# Patient Record
Sex: FEMALE
[De-identification: names, ages, dates, MRNs, and addresses within clinical notes are randomized per-mention and may not be internally consistent; named-entity substitution may affect disease eponyms.]

---

## 2022-02-05 ENCOUNTER — TRANSCRIPTION ENCOUNTER (OUTPATIENT)
Age: 67
End: 2022-02-05

## 2022-02-05 ENCOUNTER — EMERGENCY (EMERGENCY)
Facility: HOSPITAL | Age: 67
LOS: 1 days | Discharge: HOME | End: 2022-02-05
Attending: STUDENT IN AN ORGANIZED HEALTH CARE EDUCATION/TRAINING PROGRAM | Admitting: STUDENT IN AN ORGANIZED HEALTH CARE EDUCATION/TRAINING PROGRAM
Payer: MEDICARE

## 2022-02-05 VITALS
DIASTOLIC BLOOD PRESSURE: 59 MMHG | OXYGEN SATURATION: 98 % | HEART RATE: 60 BPM | RESPIRATION RATE: 18 BRPM | SYSTOLIC BLOOD PRESSURE: 136 MMHG

## 2022-02-05 VITALS
OXYGEN SATURATION: 100 % | DIASTOLIC BLOOD PRESSURE: 87 MMHG | HEART RATE: 58 BPM | TEMPERATURE: 98 F | SYSTOLIC BLOOD PRESSURE: 121 MMHG | RESPIRATION RATE: 14 BRPM

## 2022-02-05 LAB
ALBUMIN SERPL ELPH-MCNC: 4.1 G/DL — SIGNIFICANT CHANGE UP (ref 3.3–5)
ALP SERPL-CCNC: 69 U/L — SIGNIFICANT CHANGE UP (ref 40–120)
ALT FLD-CCNC: 22 U/L — SIGNIFICANT CHANGE UP (ref 4–33)
ANION GAP SERPL CALC-SCNC: 11 MMOL/L — SIGNIFICANT CHANGE UP (ref 7–14)
AST SERPL-CCNC: 26 U/L — SIGNIFICANT CHANGE UP (ref 4–32)
BASOPHILS # BLD AUTO: 0.03 K/UL — SIGNIFICANT CHANGE UP (ref 0–0.2)
BASOPHILS NFR BLD AUTO: 0.3 % — SIGNIFICANT CHANGE UP (ref 0–2)
BILIRUB SERPL-MCNC: 0.4 MG/DL — SIGNIFICANT CHANGE UP (ref 0.2–1.2)
BUN SERPL-MCNC: 11 MG/DL — SIGNIFICANT CHANGE UP (ref 7–23)
CALCIUM SERPL-MCNC: 9 MG/DL — SIGNIFICANT CHANGE UP (ref 8.4–10.5)
CHLORIDE SERPL-SCNC: 108 MMOL/L — HIGH (ref 98–107)
CO2 SERPL-SCNC: 24 MMOL/L — SIGNIFICANT CHANGE UP (ref 22–31)
CREAT SERPL-MCNC: 1.12 MG/DL — SIGNIFICANT CHANGE UP (ref 0.5–1.3)
EOSINOPHIL # BLD AUTO: 0.32 K/UL — SIGNIFICANT CHANGE UP (ref 0–0.5)
EOSINOPHIL NFR BLD AUTO: 3.5 % — SIGNIFICANT CHANGE UP (ref 0–6)
GLUCOSE SERPL-MCNC: 101 MG/DL — HIGH (ref 70–99)
HCT VFR BLD CALC: 35.7 % — SIGNIFICANT CHANGE UP (ref 34.5–45)
HGB BLD-MCNC: 11.6 G/DL — SIGNIFICANT CHANGE UP (ref 11.5–15.5)
IANC: 4.78 K/UL — SIGNIFICANT CHANGE UP (ref 1.5–8.5)
IMM GRANULOCYTES NFR BLD AUTO: 0.3 % — SIGNIFICANT CHANGE UP (ref 0–1.5)
LYMPHOCYTES # BLD AUTO: 3.25 K/UL — SIGNIFICANT CHANGE UP (ref 1–3.3)
LYMPHOCYTES # BLD AUTO: 36 % — SIGNIFICANT CHANGE UP (ref 13–44)
MCHC RBC-ENTMCNC: 26.1 PG — LOW (ref 27–34)
MCHC RBC-ENTMCNC: 32.5 GM/DL — SIGNIFICANT CHANGE UP (ref 32–36)
MCV RBC AUTO: 80.4 FL — SIGNIFICANT CHANGE UP (ref 80–100)
MONOCYTES # BLD AUTO: 0.62 K/UL — SIGNIFICANT CHANGE UP (ref 0–0.9)
MONOCYTES NFR BLD AUTO: 6.9 % — SIGNIFICANT CHANGE UP (ref 2–14)
NEUTROPHILS # BLD AUTO: 4.78 K/UL — SIGNIFICANT CHANGE UP (ref 1.8–7.4)
NEUTROPHILS NFR BLD AUTO: 53 % — SIGNIFICANT CHANGE UP (ref 43–77)
NRBC # BLD: 0 /100 WBCS — SIGNIFICANT CHANGE UP
NRBC # FLD: 0 K/UL — SIGNIFICANT CHANGE UP
PLATELET # BLD AUTO: 266 K/UL — SIGNIFICANT CHANGE UP (ref 150–400)
POTASSIUM SERPL-MCNC: 3.9 MMOL/L — SIGNIFICANT CHANGE UP (ref 3.5–5.3)
POTASSIUM SERPL-SCNC: 3.9 MMOL/L — SIGNIFICANT CHANGE UP (ref 3.5–5.3)
PROT SERPL-MCNC: 6.6 G/DL — SIGNIFICANT CHANGE UP (ref 6–8.3)
RBC # BLD: 4.44 M/UL — SIGNIFICANT CHANGE UP (ref 3.8–5.2)
RBC # FLD: 15.7 % — HIGH (ref 10.3–14.5)
SODIUM SERPL-SCNC: 143 MMOL/L — SIGNIFICANT CHANGE UP (ref 135–145)
TROPONIN T, HIGH SENSITIVITY RESULT: <6 NG/L — SIGNIFICANT CHANGE UP
WBC # BLD: 9.03 K/UL — SIGNIFICANT CHANGE UP (ref 3.8–10.5)
WBC # FLD AUTO: 9.03 K/UL — SIGNIFICANT CHANGE UP (ref 3.8–10.5)

## 2022-02-05 PROCEDURE — 93010 ELECTROCARDIOGRAM REPORT: CPT

## 2022-02-05 PROCEDURE — 71046 X-RAY EXAM CHEST 2 VIEWS: CPT | Mod: 26

## 2022-02-05 PROCEDURE — 99285 EMERGENCY DEPT VISIT HI MDM: CPT | Mod: 25

## 2022-02-05 RX ORDER — LIDOCAINE 4 G/100G
1 CREAM TOPICAL ONCE
Refills: 0 | Status: COMPLETED | OUTPATIENT
Start: 2022-02-05 | End: 2022-02-05

## 2022-02-05 RX ORDER — ACETAMINOPHEN 500 MG
650 TABLET ORAL ONCE
Refills: 0 | Status: COMPLETED | OUTPATIENT
Start: 2022-02-05 | End: 2022-02-05

## 2022-02-05 RX ORDER — IBUPROFEN 200 MG
600 TABLET ORAL ONCE
Refills: 0 | Status: COMPLETED | OUTPATIENT
Start: 2022-02-05 | End: 2022-02-05

## 2022-02-05 RX ADMIN — Medication 650 MILLIGRAM(S): at 16:00

## 2022-02-05 RX ADMIN — Medication 600 MILLIGRAM(S): at 16:05

## 2022-02-05 RX ADMIN — LIDOCAINE 1 PATCH: 4 CREAM TOPICAL at 16:00

## 2022-02-05 NOTE — ED PROVIDER NOTE - CLINICAL SUMMARY MEDICAL DECISION MAKING FREE TEXT BOX
Aureliano Diaz MD. pt with no significant pmhx presents for midsternal CP today. pt also having 3 days of L scapula pain in setting that pt did shovel snow 1 week ago. the pain is worse w/ movement. it is not pleuritic in nature. denies other associated sx. pt has reproducible chest wall tenderness in the midsternum where she is having pain as well as tender to the trapezius and left scapula. pt otherwise well appaering. lungs CTA. pt likely w/ MSK pain however, will eval for ACS. EKG non ischemic. will give analgesia, labs, cxr, reassess

## 2022-02-05 NOTE — ED PROVIDER NOTE - PROGRESS NOTE DETAILS
Booker SABILLON: Received sign out from my colleague Dr. Hartmann pt presents w chest discomfort likely MSK also a/w trapezius pain pending labs at time of sign out, trop neg, stable for dc w pmd/cards fu.

## 2022-02-05 NOTE — ED ADULT NURSE NOTE - OBJECTIVE STATEMENT
Patient is a 67 yo female, denies PMH, presenting with chest pain starting at 8AM. Patient stated she went to urgent care and they sent her to the ED for PVCs on EKG. Patient also reports she was shoveling snow on Saturday and started having back pain on Sunday. Patient AAOx4, endorsing SOB but denies dizziness, fever, nausea, vomiting, diarrhea. No signs of distress. Placed on cardiac monitor, VSS, NSR. 20G PIV placed to RAC, labs sent per orders. Patient educated on fall precautions.

## 2022-02-05 NOTE — ED PROVIDER NOTE - NSFOLLOWUPCLINICSTOKEN_GEN_ALL_ED_FT
[id number]: || ||00\01||False; [id number]: || ||00\01||False;[id number]: || ||00\01||False;[id number]: || ||00\01||False;[id number]: || ||00\01||False;[id number]: || ||00\01||False;

## 2022-02-05 NOTE — ED ADULT TRIAGE NOTE - CHIEF COMPLAINT QUOTE
Patient arrives with ems from home for c/o mid chest pain since Wednesday, getting worse today.  Patient states she was shoveling the snow on Saturday.
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
## 2022-02-05 NOTE — ED PROVIDER NOTE - NS ED ROS FT
Constitutional: no fevers; no chills  HEENT: no visual changes, no sore throat, no rhinorrhea  CV: cp; no palpitations  Resp: no sob; no cough  GI: no abd pain, no nausea, no vomiting, no diarrhea, no constipation  : no dysuria, no hematuria  MSK: L shoulder pain  skin: no rashes  neuro: no HA, no numbness; no weakness, no tingling  endo: no polyuria, no polydipsia

## 2022-02-05 NOTE — ED PROVIDER NOTE - NSFOLLOWUPINSTRUCTIONS_ED_ALL_ED_FT
You were seen today in the emergency room for chest pain. Although the testing done today indicates that your pain is not from an acute emergency, your pain could still represent a problem with your heart. You need to follow up with your doctor and/or a cardiologist in the next 48-72 hours. If you develop any new or worsening symptoms you need to return immediately to the emergency department. If you experience any of the following please come right back to the emergency room: chest pain that becomes much worse with walking up stairs or exercising, uncontrollable nausea and vomiting, severe chest pain that will not go away, passing out, new persistent numbness and/or weakness. If we discussed that this pain was likely due to a muscle strain or sprain you should take over the counter medications such as Tylenol. You should avoid taking medications such as ibuprofen, Motrin, Advil or other NSAIDs until you speak with your doctor about your pain.     You may take Tylenol 1000mg and ibuprofen 400mg every 6 hours as needed for pain as directed by your primary care physician.     You can also  a lidocaine patch at your local pharmacy.     A number for a cardiologist will be provided for you.

## 2022-02-05 NOTE — ED PROVIDER NOTE - OBJECTIVE STATEMENT
67 yo F with no significant PMHx presents to the ED c/o midsternal CP that occurred while walking her dog this morning. It resolved with rest. One week ago, pt shoveled snow and then was feeling fine up until 3 days ago, when she developed left scapular pain  that is worse w/ movement. She went to a walk in clinic, got an ECG done, and was told to go to the ED. She denies associated SOB, diaphoresis, n/v/d, cough, leg pain, leg swelling, dizziness, palpitations. She is fully vaccinated and boosted against COVID-19. Pt took Tylenol 500 mg at 8am this morning.

## 2022-02-05 NOTE — ED PROVIDER NOTE - PATIENT PORTAL LINK FT
You can access the FollowMyHealth Patient Portal offered by [facility] by registering at the following website: [email or web address]. By joining [facility]’s FollowMyHealth portal, you will also be able to view your health information using other applications (apps) compatible with our system.

## 2022-02-05 NOTE — ED PROVIDER NOTE - PHYSICAL EXAMINATION
PHYSICAL EXAM:  GENERAL: non-toxic appearing; in no respiratory distress  HEAD Atraumatic, Normocephalic  NECK: No JVD; trachea midline  EYES: PERRL, EOMs intact b/l w/out deficits; normal conjunctiva  CHEST/LUNG: CTAB no wheezes/rhonchi/rales  HEART: RRR no murmur/gallops/rubs  ABDOMEN: +BS, soft, NT, ND  EXTREMITIES: No LE edema, +2 radial pulses b/l, +2 DP/PT pulses b/l  MUSCULOSKELETAL: FROM of all 4 extremities; midsternal chest wall tenderness; tender to the left trapezius and scapular region;   NERVOUS SYSTEM:  A&Ox3, No motor deficits or sensory deficits; CNII-XII intact; Speech is fluent and appropriate  SKIN:  Warm and dry as visualized

## 2022-02-05 NOTE — ED PROVIDER NOTE - NSFOLLOWUPCLINICS_GEN_ALL_ED_FT
[facility]  Cardiology  [address] [place] [address], [address]  [place] [address]  Phone: [phone]  Fax:      [facility]  Internal Medicine  [address] [place] [address]  Phone: [phone]  Fax:     [facility]  Gastroenterology  [address] [place] [address]  Phone: [phone]  Fax:     [facility]  Cardiology  [address] [place] [address]  Phone: [phone]  Fax:     [facility]  General Internal Medicine  [address] [place] [address]  Phone: [phone]  Fax:     [facility]  Cardiology  [address] [facility], [address]  [place] [address]  Phone: [phone]  Fax:

## 2022-03-21 ENCOUNTER — APPOINTMENT (OUTPATIENT)
Dept: INTERNAL MEDICINE | Facility: CLINIC | Age: 67
End: 2022-03-21
Payer: MEDICARE

## 2022-03-21 VITALS — WEIGHT: 161 LBS | BODY MASS INDEX: 28.89 KG/M2 | HEIGHT: 62.5 IN

## 2022-03-21 VITALS
OXYGEN SATURATION: 98 % | HEART RATE: 80 BPM | RESPIRATION RATE: 15 BRPM | DIASTOLIC BLOOD PRESSURE: 80 MMHG | SYSTOLIC BLOOD PRESSURE: 130 MMHG

## 2022-03-21 DIAGNOSIS — E66.3 OVERWEIGHT: ICD-10-CM

## 2022-03-21 DIAGNOSIS — Z23 ENCOUNTER FOR IMMUNIZATION: ICD-10-CM

## 2022-03-21 DIAGNOSIS — Z00.00 ENCOUNTER FOR GENERAL ADULT MEDICAL EXAMINATION W/OUT ABNORMAL FINDINGS: ICD-10-CM

## 2022-03-21 DIAGNOSIS — Z83.3 FAMILY HISTORY OF DIABETES MELLITUS: ICD-10-CM

## 2022-03-21 DIAGNOSIS — Z60.2 PROBLEMS RELATED TO LIVING ALONE: ICD-10-CM

## 2022-03-21 DIAGNOSIS — Z80.1 FAMILY HISTORY OF MALIGNANT NEOPLASM OF TRACHEA, BRONCHUS AND LUNG: ICD-10-CM

## 2022-03-21 PROCEDURE — 90732 PPSV23 VACC 2 YRS+ SUBQ/IM: CPT

## 2022-03-21 PROCEDURE — G0438: CPT

## 2022-03-21 PROCEDURE — G0442 ANNUAL ALCOHOL SCREEN 15 MIN: CPT | Mod: 59

## 2022-03-21 PROCEDURE — 99406 BEHAV CHNG SMOKING 3-10 MIN: CPT

## 2022-03-21 PROCEDURE — G0009: CPT

## 2022-03-21 SDOH — SOCIAL STABILITY - SOCIAL INSECURITY: PROBLEMS RELATED TO LIVING ALONE: Z60.2

## 2022-03-21 NOTE — HEALTH RISK ASSESSMENT
[Current] : Current [10-14] : 10-14 [Never (0 pts)] : Never (0 points) [0] : 2) Feeling down, depressed, or hopeless: Not at all (0) [PHQ-2 Negative - No further assessment needed] : PHQ-2 Negative - No further assessment needed [Patient reported PAP Smear was normal] : Patient reported PAP Smear was normal [No] : In the past 12 months have you used drugs other than those required for medical reasons? No [PapSmearDate] : 01/2020 [ColonoscopyDate] : 01/2012 [HIVDate] : 2015 [HepatitisCDate] : 2015 [de-identified] : 5-8 cigarettes x 30 years [Audit-CScore] : 0 [de-identified] : see HPI [de-identified] : see HPI [[id number]] : 0

## 2022-03-21 NOTE — PHYSICAL EXAM
[Normal TMs] : both tympanic membranes were normal [No Edema] : there was no peripheral edema [Normal Supraclavicular Nodes] : no supraclavicular lymphadenopathy [Coordination Grossly Intact] : coordination grossly intact [Normal Gait] : normal gait [Normal] : affect was normal and insight and judgment were intact

## 2022-03-21 NOTE — HISTORY OF PRESENT ILLNESS
[FreeTextEntry8] : 66F with no PMH had visited urgent care in February for chest and upper back pain and was referred to [facility] ER 2/5/22 - she had midsternal CP that started when walking the dog. It resolved with rest. One week prior she developed left scapular pain after shoveling snow. She was discharged from the ER with negative troponin and dx of likely trapezius pain. She was instructed to f/u with PMD and cardiology. Trop was <6. CBC and CMP normal but GFR 56. Patient presents for f/u after ER visit and to establish care she was also told to follow up with a cardiologist. . \par \par PMH: none\par PSH: tonsillectomy age 22, carpal tunnel surgery right hand, neck surgery - metal plate in vertebrae. Had worked for phone company and developed neck problems. There is metal plate in neck. Left knee surgery - torn meniscus 2008. \par FH: mother and father passed away. mother had DM age 75. father had lung cancer -  exposure to asbestos. \par SH: current smoker. smokes 5-8 cigarettes/day. If pt is upset might smoke more. Smoked for 30 years. \par \par Quit date set by patient: 6/10/22\par \par Patient will start with 21/14/7mg patch daily for six weeks and will step down to lower dose patch as tolerated. They are also prescribed for nicotine gum which they will "chew and park" whenever they have a craving. Reviewed directions to chew gum until nicotine taste and "park" next to gums. Chew and replace as needed. They should f/u with me in the next couple weeks via telehealth visit to monitor progress on cigarette cessation. \par \par No alcohol use in 2 years. Stopped during pandemic in case she might have to drive somewhere. Otherwise might drink if she has company or a party. No drug use. \par Exercise - pt used to walk but afraid of getting COVID. \par Diet - home cooked foods. chicken, pork, lamb, turkey. vegetables. drinks water. no sugary drink. drinks coffee and puts sugar in that. \par Lives alone with dog, 31 year old son just moved out he lives in [place]. Has friends nearby. \par Retired, used to work for phone company. \par Does gardening. \par Not sexually active. \par HIV/HCV was negative - 2017\par \par \par \par Medications: none\par Allergies: adhesive on lidocaine patch, beef, thimerosal, soy, chocolate, seafood, preservatives. These cause stomach issues. Thimerosal causes eye swelling (in eye drops). Latex allergy. \par

## 2022-03-21 NOTE — ASSESSMENT
[FreeTextEntry1] : 66F with no PMH had visited urgent care in February for chest and upper back pain and was referred to [facility] ER 2/5/22 - she had midsternal CP that started when walking the dog. It resolved with rest. One week prior she developed left scapular pain after shoveling snow. She was discharged from the ER with negative troponin and dx of likely trapezius pain. She was instructed to f/u with PMD and cardiology. Trop was <6. CBC and CMP normal but GFR 56. Patient presents for f/u after ER visit and to establish care\par \par 1. Chest Pain\par -if she moves a certain way on the left side it might come back but for the most part it is resolved. EKG shows no PVC, it is normal (NSR)\par -d/w pt that we can do cardiology referral but she prefers not to go after we discussed trop and EKG results\par -discussed risk factors for cardiac disease - overweight BMI, cigarette smoking\par \par 2. Cigarette Smoking\par Quit date set by patient: 6/10/22\par \par Patient will start with 21/14/7mg patch daily for six weeks and will step down to lower dose patch as tolerated. They are also prescribed for nicotine gum which they will "chew and park" whenever they have a craving. Reviewed directions to chew gum until nicotine taste and "park" next to gums. Chew and replace as needed. They should f/u with me in the next couple weeks via telehealth or in person visit to monitor progress on cigarette cessation. \par \par 2. HCM\par -mammogram - giving referral\par -lung cancer screening - pack years less than 15\par -colon cancer screening: had colonoscopy in 2012\par -HIV/HCV screening - offered today\par -COVID vaccine - vaccinated and booster completed. \par -flu vaccine - declined\par -tdap - will get in pharmacy\par -shingrix - 4 years ago\par -prevnar 13 - 4 years ago. \par -pneumovax - giving today \par \par rtc 1 month for smoking cessation f/u

## 2022-03-21 NOTE — COUNSELING
[Cessation strategies including cessation program discussed] : Cessation strategies including cessation program discussed [Use of nicotine replacement therapies and other medications discussed] : Use of nicotine replacement therapies and other medications discussed [Encouraged to pick a quit date and identify support needed to quit] : Encouraged to pick a quit date and identify support needed to quit [Yes] : Willing to quit smoking [FreeTextEntry1] : 10

## 2022-03-22 LAB
CHOLEST SERPL-MCNC: 238 MG/DL
ESTIMATED AVERAGE GLUCOSE: 111 MG/DL
HBA1C MFR BLD HPLC: 5.5 %
HCV AB SER QL: NONREACTIVE
HCV S/CO RATIO: 0.24 S/CO
HDLC SERPL-MCNC: 68 MG/DL
HIV1+2 AB SPEC QL IA.RAPID: NONREACTIVE
LDLC SERPL CALC-MCNC: 150 MG/DL
NONHDLC SERPL-MCNC: 170 MG/DL
TRIGL SERPL-MCNC: 100 MG/DL

## 2022-03-31 LAB — HEMOCCULT STL QL IA: NEGATIVE

## 2022-05-02 ENCOUNTER — APPOINTMENT (OUTPATIENT)
Dept: INTERNAL MEDICINE | Facility: CLINIC | Age: 67
End: 2022-05-02
Payer: MEDICARE

## 2022-05-02 VITALS
DIASTOLIC BLOOD PRESSURE: 80 MMHG | RESPIRATION RATE: 15 BRPM | WEIGHT: 161 LBS | BODY MASS INDEX: 28.98 KG/M2 | SYSTOLIC BLOOD PRESSURE: 130 MMHG | HEART RATE: 71 BPM | OXYGEN SATURATION: 98 %

## 2022-05-02 PROCEDURE — 99214 OFFICE O/P EST MOD 30 MIN: CPT

## 2022-05-02 NOTE — COUNSELING
[Yes] : Risk of tobacco use and health benefits of smoking cessation discussed: Yes [Use of nicotine replacement therapies and other medications discussed] : Use of nicotine replacement therapies and other medications discussed [FreeTextEntry1] : 5

## 2022-05-02 NOTE — HISTORY OF PRESENT ILLNESS
[FreeTextEntry1] : smoking cessation f/u, statin counselling [de-identified] : 66F with no PMH had visited urgent care in February for chest and upper back pain and negative cardiac w/u who is a current smoker here for f/u of smoking cessation. \par \par Has not quit smoking yet as the month of April was very hard  on patient due to her taxes being lost and that her son moved out. Now that he moved out, she has settled into a comfortable and healthier routine with dietary decrease in saturated fat (her son likes to bake and eat seymour and cheese and enjoyed cooking).  She has a dog with her and is happy with her current living situation.  Last time we checked her lipid profile she had eaten breakfast and lunch before the labs are drawn.  Today she comes and is fasting and is looking forward to repeating the test.  We discussed that the ASCVD risk will probably still be elevated given her age and smoking history and how that impacts her risk for heart disease in the future.  She is going to work on quitting smoking.  Found that the prescription sent to the pharmacy were very expensive and is going to call the [facility] quits hotline (was written down for patient on printed lipid profile along with explanation for lipid profile elements).

## 2022-05-02 NOTE — ASSESSMENT
[FreeTextEntry1] : 66F with no PMH had visited urgent care in February for chest and upper back pain and negative cardiac w/u who is a current smoker here for f/u of smoking cessation. \par \par 1. Cigarette Smoking\par Quit date set by patient: 6/10/22 -reviewed below advice with patient.  Also provided with phone number for free looking cessation medications [phone]. \par \par Patient will start with 21/14/7mg patch daily for six weeks and will step down to lower dose patch as tolerated. They are also prescribed for nicotine gum which they will "chew and park" whenever they have a craving. Reviewed directions to chew gum until nicotine taste and "park" next to gums. Chew and replace as needed. They should f/u with me in the next couple weeks via telehealth or in person visit to monitor progress on cigarette cessation. \par \par 2. HCM\par -mammogram - referral given, not scheduled\par -lung cancer screening - pack years less than 15\par -colon cancer screening: had colonoscopy in 2012; repeat this year, referral given last visit. \par -HIV/HCV screening - neg 03/2022\par -COVID vaccine - vaccinated and booster completed. \par -flu vaccine - declined\par -tdap - will get in pharmacy\par -shingrix - 4 years ago\par -prevnar 13 - 4 years ago. \par -pneumovax - 03/22\par \par 3. HLD and elevated ASCVD risk\par Reviewed below\par Your lipid profile results are:\par Triglyceride level was 100 mg/dL, the goal is to be under 150 mg/dL\par Total cholesterol was 238 mg/dL, the goal is to be under 200 mg/dL\par However, the types of cholesterol and the levels as reviewed above may be more important than the total. \par HDL ("good cholesterol") was 68 mg/dL\par The goal is to be over 40 mg/dL for men, 50 mg/dL for women. When HDL is higher it is cardioprotective. \par LDL ("bad cholesterol") was 150 mg/dL. \par For anyone without a history of atherosclerotic cardiovascular disease the goal is to be under 130 mg/dL, patients with certain health conditions may require a LDL under 70 mg/dL, for most patients an LDL under 100 mg/dL is ideal\par Non-HDL cholesterol was 170 mg/dL\par The goal is to be under 130 mg/dL. Non-HDL cholesterol includes other lipids like VLDL, IDL or chylomicron remnants \par \par Below is some information on how to maintain healthy cholesterol:\par \par -LDL ("bad" cholesterol): This level is controlled by reducing the total and saturated fat (found in animal products such as cheese, butter, and red meat) in your diet, losing weight (if you are overweight or obese), getting regular aerobic exercise, and eating plenty of fruits and vegetables. \par -HDL ("good" cholesterol): You can increase your HDL with regular exercise, decreasing intake of refined carbohydrates (white bread, rice, high sugar foods or drinks, sweets and desserts), smoking cessation (if you smoke cigarettes) and weight loss (if you are overweight or obese).\par -Triglycerides: Triglycerides are fat-like substances in the blood. Ways to maintain a healthy triglyceride level include: weight loss (if you are overweight), regular exercise, avoid foods and drinks with a lot of sugar and carbohydrates (bread, fruit juice, soda, and sweets), avoid red meat, butter, fried foods, cheese, oils, and nuts, and limit alcohol - general guidelines are less than 2 drinks a day for men, and no more than 1 drink a day for women.\par \par \par rtc in 3 months. \par

## 2022-05-03 LAB
CHOLEST SERPL-MCNC: 250 MG/DL
HDLC SERPL-MCNC: 65 MG/DL
LDLC SERPL CALC-MCNC: 161 MG/DL
NONHDLC SERPL-MCNC: 185 MG/DL
TRIGL SERPL-MCNC: 121 MG/DL

## 2022-06-28 ENCOUNTER — APPOINTMENT (OUTPATIENT)
Dept: INTERNAL MEDICINE | Facility: CLINIC | Age: 67
End: 2022-06-28

## 2022-06-28 VITALS
OXYGEN SATURATION: 99 % | BODY MASS INDEX: 29.07 KG/M2 | HEIGHT: 62.5 IN | SYSTOLIC BLOOD PRESSURE: 120 MMHG | DIASTOLIC BLOOD PRESSURE: 70 MMHG | WEIGHT: 162 LBS | HEART RATE: 76 BPM

## 2022-06-28 PROCEDURE — 99213 OFFICE O/P EST LOW 20 MIN: CPT

## 2022-06-30 NOTE — REVIEW OF SYSTEMS
[Fever] : no fever [Chills] : no chills [Night Sweats] : no night sweats [Recent Change In Weight] : ~T no recent weight change [Negative] : Genitourinary [FreeTextEntry4] : see hpi  [FreeTextEntry7] : see hpi

## 2022-06-30 NOTE — PHYSICAL EXAM
[No Acute Distress] : no acute distress [Well Nourished] : well nourished [Well Developed] : well developed [Well-Appearing] : well-appearing [Normal Sclera/Conjunctiva] : normal sclera/conjunctiva [PERRL] : pupils equal round and reactive to light [Normal Outer Ear/Nose] : the outer ears and nose were normal in appearance [Normal TMs] : both tympanic membranes were normal [Normal Nasal Mucosa] : the nasal mucosa was normal [Supple] : supple [No Respiratory Distress] : no respiratory distress  [No Accessory Muscle Use] : no accessory muscle use [Clear to Auscultation] : lungs were clear to auscultation bilaterally [Normal Percussion] : the chest was normal to percussion [Soft] : abdomen soft [Non Tender] : non-tender [Non-distended] : non-distended [No HSM] : no HSM [Normal Bowel Sounds] : normal bowel sounds [Normal Supraclavicular Nodes] : no supraclavicular lymphadenopathy [Normal Posterior Cervical Nodes] : no posterior cervical lymphadenopathy [Normal Anterior Cervical Nodes] : no anterior cervical lymphadenopathy [de-identified] : no oral lesions; on mid tongue an area of retained saliva/whitish papilla - not suspicious, some of material scrapes off w tongue depressor.  Otherwise normal oral exam

## 2022-06-30 NOTE — HISTORY OF PRESENT ILLNESS
[FreeTextEntry8] : Came because she was worried about her tongue - had a URI at first, with some congestion and some sore throat.  That was mild, and with the two nights of that congestion, noticed more of a thicker white coating on her tongue.  After the URI symptoms were passing she then got a feeling of stomach upset, and also heartburn/regurgitation.  Then she had a day with that of diarrhea, all watery, without blood or mucus.  Did not make fever/chills.  Symptoms now resolving, but was worried about tongue mari as an ex smoker.  Most of the whitish coating is now resolved, but has a focal area still looking white to her mid tongue she'd like to be sure about.

## 2022-08-08 ENCOUNTER — APPOINTMENT (OUTPATIENT)
Dept: INTERNAL MEDICINE | Facility: CLINIC | Age: 67
End: 2022-08-08

## 2022-08-08 DIAGNOSIS — K14.3 HYPERTROPHY OF TONGUE PAPILLAE: ICD-10-CM

## 2022-08-08 DIAGNOSIS — Z86.19 PERSONAL HISTORY OF OTHER INFECTIOUS AND PARASITIC DISEASES: ICD-10-CM

## 2022-08-08 PROCEDURE — 99214 OFFICE O/P EST MOD 30 MIN: CPT

## 2022-08-09 LAB
CHOLEST SERPL-MCNC: 221 MG/DL
HBV SURFACE AB SER QL: NONREACTIVE
HBV SURFACE AB SERPL IA-ACNC: <3 MIU/ML
HDLC SERPL-MCNC: 68 MG/DL
LDLC SERPL CALC-MCNC: 134 MG/DL
MEV IGG FLD QL IA: 43.4 AU/ML
MEV IGG+IGM SER-IMP: POSITIVE
MUV AB SER-ACNC: NEGATIVE
MUV IGG SER QL IA: <5 AU/ML
NONHDLC SERPL-MCNC: 153 MG/DL
RUBV IGG FLD-ACNC: <0.1 INDEX
RUBV IGG SER-IMP: NEGATIVE
TRIGL SERPL-MCNC: 95 MG/DL
VZV AB TITR SER: POSITIVE
VZV IGG SER IF-ACNC: 1568 INDEX

## 2022-08-09 NOTE — HISTORY OF PRESENT ILLNESS
[FreeTextEntry1] : smoking cessation, hld [de-identified] : 66F current smoker, elevated ASCVD risk presenting for follow up. \par \par Tried patch but felt it was hard to  her arm. This was after gardening. She felt muscle soreness. So she removed it. \par \par She is fasting today to repeat cholesterol\par \par She noticed some swelling in her left ankle. She had surgery on that left knee and that leg tends to swell from the knee down. She has some varicose veins and wonders if the burning is related to that. \par \par Interested in being up to date on vaccines - going to obtain in pharmacy. Wants to check MMR titers today. Concerned about monkey pox.

## 2022-08-09 NOTE — PHYSICAL EXAM
[No Acute Distress] : no acute distress [Normal Voice/Communication] : normal voice/communication [No Respiratory Distress] : no respiratory distress  [Coordination Grossly Intact] : coordination grossly intact [Normal Gait] : normal gait [Normal] : affect was normal and insight and judgment were intact [de-identified] : Left knee is larger in size than right knee (history of surgery on this knee in the past).  There are varicose veins on the lateral side of the left knee.  There is no leg swelling, no pitting edema in either leg.  Bilateral ankles are normal.  Gait is normal.  No joint effusion, redness, warmth.  Nontender to touch.

## 2022-08-09 NOTE — ASSESSMENT
[FreeTextEntry1] : 66F current smoker, elevated ASCVD risk presenting for follow up. \par \par 1. Cigarette Smoking\par Quit date set by patient: 6/10/22 -reviewed below advice with patient. Also provided with phone number for free looking cessation medications [phone]. \par -on NRT; willing to try patch again\par \par 2. HLD and elevated ASCVD risk\par -pt wanted to try diet/exercise before starting recommended statin; she picked up medication. We will repeat labs today\par \par 3. Concern for vaccines/monkey pox\par -According to current guidance by CDC and [place] Department of Health, I do not believe that there is any reason that patient can visit the gym or continue with regular activities.  Low risk of infection with monkey Rose however recommend staying away from anyone who is more likely to be affected by this infection, anyone with skin lesions or who may appear ill.  Continue with handwashing and standard mask/isolation precautions for ongoing COVID pandemic\par -Checking MMR titers as requested.  Recommended she obtain Tdap from pharmacy\par \par 3. HCM\par -mammogram - referral given, not scheduled yet. Renewed again. \par -lung cancer screening - pack years less than 15\par -colon cancer screening: had colonoscopy in 2017; told ot return in 10 years. \par -HIV/HCV screening - neg 03/2022\par -COVID vaccine - vaccinated and booster completed. \par -flu vaccine - declined\par -tdap - will get in pharmacy\par -shingrix - 4 years ago\par -prevnar 13 - 4 years ago. \par -pneumovax - 03/22\par \par rtc in 3 months. addendum reviewed results - start rosuvastatin 10 mg daily (pt wants to try every other day, this is ok and we will repeat lipid profile next visit) she wants MMR booster and HBV vaccine\par \par

## 2022-11-07 ENCOUNTER — APPOINTMENT (OUTPATIENT)
Dept: INTERNAL MEDICINE | Facility: CLINIC | Age: 67
End: 2022-11-07

## 2022-11-07 VITALS
DIASTOLIC BLOOD PRESSURE: 70 MMHG | SYSTOLIC BLOOD PRESSURE: 130 MMHG | HEIGHT: 62.5 IN | BODY MASS INDEX: 28.17 KG/M2 | HEART RATE: 67 BPM | OXYGEN SATURATION: 98 % | WEIGHT: 157 LBS

## 2022-11-07 DIAGNOSIS — S76.911A STRAIN OF UNSPECIFIED MUSCLES, FASCIA AND TENDONS AT THIGH LEVEL, RIGHT THIGH, INITIAL ENCOUNTER: ICD-10-CM

## 2022-11-07 PROCEDURE — 99214 OFFICE O/P EST MOD 30 MIN: CPT

## 2022-11-08 LAB
CHOLEST SERPL-MCNC: 228 MG/DL
HDLC SERPL-MCNC: 67 MG/DL
LDLC SERPL CALC-MCNC: 140 MG/DL
NONHDLC SERPL-MCNC: 161 MG/DL
TRIGL SERPL-MCNC: 106 MG/DL

## 2022-11-08 NOTE — HISTORY OF PRESENT ILLNESS
[FreeTextEntry1] : follow up [de-identified] : 67F current smoker, elevated ASCVD risk presenting for follow up. \par \par Pt reports she had 2 rough weeks - she feels she pulled a muscle picking up the water cooler. She threw off her left knee and back. She got an electric stimulator from chiropractor years ago. She put it on her back and knee and groin and that helped the pain improve over the psat week. She feels a little twinge. She noticed some swelling in the groin wonders if its a lymph node. She also had a cold sore on her lip due to stress/pain. Extra strength tylenol also helped. Pain now resolved and swelling also resolved.

## 2022-11-08 NOTE — REVIEW OF SYSTEMS
[Muscle Pain] : muscle pain [Fever] : no fever [Chest Pain] : no chest pain [Shortness Of Breath] : no shortness of breath

## 2022-11-08 NOTE — PHYSICAL EXAM
[Normal] : no acute distress, well nourished, well developed and well-appearing [de-identified] : exam of right groin reveals no lymphadenopathy, no tenderness to palpation and no masses, erythema or swelling

## 2022-11-08 NOTE — ASSESSMENT
[FreeTextEntry1] : 67F current smoker, elevated ASCVD risk presenting for follow up. \par \par 1. Cigarette Smoking\par Quit date set by patient: 6/10/22 -reviewed below advice with patient. Also provided with phone number for free looking cessation medications [phone]. cut down to 3 or 4 daily. \par -NRT too strong. lozenges hurt her tongue. gum not an option due to dental work. she finds that stressful moments are a trigger. She doesn’t always have someone to reach out to when she is stressed, we suggested a walk outside or something like speed walking to reduce the stress which pt feels is a good alternative for her. she is going to try that. also provided sbirt card for patient to call for additional support. \par \par 2. HLD and elevated ASCVD risk\par -started rosuvastatin 10 mg last visit\par addendum\par Date of encounter: 11/08/2022 \par Time of encounter: 10:00AM\par [phone] number - no voicemail available. asking for access code\par left message at [phone] number. recommend taking the 10 mg every day. and repeat visit in 2 months to f/u on smoking cessation and lipids \par \par 3. HCM\par -mammogram - birads 1 08/2022\par -lung cancer screening - pack years less than 15\par -colon cancer screening: had colonoscopy in 2017; told to return in 10 years. \par -HIV/HCV screening - neg 03/2022\par -COVID vaccine - vaccinated and booster completed. \par -flu vaccine - declined\par -tdap - going to get in pharmacy \par -shingrix - 4 years ago\par -prevnar 13 - 4 years ago. \par -pneumovax - 03/22\par -she wants MMR booster and HBV vaccine\par \par awaiting labs to determine f/u

## 2022-12-01 ENCOUNTER — NON-APPOINTMENT (OUTPATIENT)
Age: 67
End: 2022-12-01

## 2022-12-01 DIAGNOSIS — K13.0 DISEASES OF LIPS: ICD-10-CM

## 2023-02-17 NOTE — REVIEW OF SYSTEMS
[Negative] : Heme/Lymph Cimetidine Counseling:  I discussed with the patient the risks of Cimetidine including but not limited to gynecomastia, headache, diarrhea, nausea, drowsiness, arrhythmias, pancreatitis, skin rashes, psychosis, bone marrow suppression and kidney toxicity.

## 2023-03-24 ENCOUNTER — APPOINTMENT (OUTPATIENT)
Dept: INTERNAL MEDICINE | Facility: CLINIC | Age: 68
End: 2023-03-24
Payer: MEDICARE

## 2023-03-27 ENCOUNTER — APPOINTMENT (OUTPATIENT)
Dept: INTERNAL MEDICINE | Facility: CLINIC | Age: 68
End: 2023-03-27
Payer: MEDICARE

## 2023-03-27 ENCOUNTER — TRANSCRIPTION ENCOUNTER (OUTPATIENT)
Age: 68
End: 2023-03-27

## 2023-03-27 VITALS
HEIGHT: 62.5 IN | BODY MASS INDEX: 27.81 KG/M2 | DIASTOLIC BLOOD PRESSURE: 80 MMHG | OXYGEN SATURATION: 97 % | HEART RATE: 53 BPM | SYSTOLIC BLOOD PRESSURE: 140 MMHG | WEIGHT: 155 LBS

## 2023-03-27 DIAGNOSIS — F17.200 NICOTINE DEPENDENCE, UNSPECIFIED, UNCOMPLICATED: ICD-10-CM

## 2023-03-27 LAB
CHOLEST SERPL-MCNC: 202 MG/DL
HDLC SERPL-MCNC: 60 MG/DL
LDLC SERPL CALC-MCNC: 123 MG/DL
NONHDLC SERPL-MCNC: 143 MG/DL
TRIGL SERPL-MCNC: 100 MG/DL

## 2023-03-27 PROCEDURE — 99214 OFFICE O/P EST MOD 30 MIN: CPT

## 2023-03-27 RX ORDER — NICOTINE POLACRILEX 2 MG/1
2 LOZENGE ORAL
Qty: 1 | Refills: 3 | Status: COMPLETED | COMMUNITY
Start: 2022-03-21 | End: 2023-03-27

## 2023-03-27 RX ORDER — NICOTINE 21 MG/24HR
21 PATCH, TRANSDERMAL 24 HOURS TRANSDERMAL DAILY
Qty: 1 | Refills: 5 | Status: COMPLETED | COMMUNITY
Start: 2022-03-21 | End: 2023-03-27

## 2023-03-27 RX ORDER — NICOTINE POLACRILEX 2 MG/1
2 GUM, CHEWING ORAL
Qty: 30 | Refills: 6 | Status: COMPLETED | COMMUNITY
Start: 2022-03-21 | End: 2023-03-27

## 2023-03-27 RX ORDER — NICOTINE 21 MG/24HR
14 PATCH, TRANSDERMAL 24 HOURS TRANSDERMAL DAILY
Qty: 1 | Refills: 5 | Status: COMPLETED | COMMUNITY
Start: 2022-03-21 | End: 2023-03-27

## 2023-03-27 RX ORDER — ROSUVASTATIN CALCIUM 20 MG/1
20 TABLET, FILM COATED ORAL DAILY
Qty: 90 | Refills: 3 | Status: ACTIVE | COMMUNITY
Start: 2022-05-03 | End: 1900-01-01

## 2023-03-27 RX ORDER — PHENYLEPHRINE HCL 10 MG
7 TABLET ORAL DAILY
Qty: 1 | Refills: 2 | Status: COMPLETED | COMMUNITY
Start: 2022-03-21 | End: 2023-03-27
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
## 2023-03-27 NOTE — PHYSICAL EXAM
[Normal] : no acute distress, well nourished, well developed and well-appearing [No Lymphadenopathy] : no lymphadenopathy [Supple] : supple

## 2023-03-27 NOTE — ASSESSMENT
[FreeTextEntry1] : 67F current smoker, elevated ASCVD risk presenting for follow up. \par \par 1. Cigarette Smoking\par -pt does not want to use gum/lozenge or patch. \par \par 2. HLD and elevated ASCVD risk\par -rosuvastatin 10 mg \par -repeat lipid profile today\par \par 3. HCM\par -mammogram - birads 1 08/2022\par -lung cancer screening - pack years less than 15\par -colon cancer screening: had colonoscopy in 2017; told to return in 10 years. \par -HIV/HCV screening - neg 03/2022\par -COVID vaccine - vaccinated and booster completed. \par -flu vaccine - declined\par -tdap - going to get in pharmacy \par -shingrix - 4 years ago\par -prevnar 13 - 4 years ago. \par -pneumovax - 03/22\par -she no longer wants MMR booster but does want HBV vaccine- first dose given today\par

## 2023-04-28 ENCOUNTER — APPOINTMENT (OUTPATIENT)
Dept: INTERNAL MEDICINE | Facility: CLINIC | Age: 68
End: 2023-04-28
Payer: MEDICARE

## 2023-04-28 PROCEDURE — 90746 HEPB VACCINE 3 DOSE ADULT IM: CPT

## 2023-04-28 PROCEDURE — G0010: CPT

## 2023-09-28 ENCOUNTER — APPOINTMENT (OUTPATIENT)
Dept: INTERNAL MEDICINE | Facility: CLINIC | Age: 68
End: 2023-09-28

## 2023-10-12 ENCOUNTER — NON-APPOINTMENT (OUTPATIENT)
Age: 68
End: 2023-10-12

## 2023-10-12 ENCOUNTER — OUTPATIENT (OUTPATIENT)
Dept: OUTPATIENT SERVICES | Facility: HOSPITAL | Age: 68
LOS: 1 days | End: 2023-10-12
Payer: MEDICARE

## 2023-10-12 ENCOUNTER — APPOINTMENT (OUTPATIENT)
Age: 68
End: 2023-10-12
Payer: MEDICARE

## 2023-10-12 VITALS
BODY MASS INDEX: 28.71 KG/M2 | DIASTOLIC BLOOD PRESSURE: 82 MMHG | OXYGEN SATURATION: 98 % | HEIGHT: 62.5 IN | WEIGHT: 160 LBS | SYSTOLIC BLOOD PRESSURE: 138 MMHG | HEART RATE: 81 BPM

## 2023-10-12 DIAGNOSIS — E78.5 HYPERLIPIDEMIA, UNSPECIFIED: ICD-10-CM

## 2023-10-12 DIAGNOSIS — Z01.818 ENCOUNTER FOR OTHER PREPROCEDURAL EXAMINATION: ICD-10-CM

## 2023-10-12 DIAGNOSIS — R87.619 UNSPECIFIED ABNORMAL CYTOLOGICAL FINDINGS IN SPECIMENS FROM CERVIX UTERI: ICD-10-CM

## 2023-10-12 DIAGNOSIS — I10 ESSENTIAL (PRIMARY) HYPERTENSION: ICD-10-CM

## 2023-10-12 DIAGNOSIS — K29.70 GASTRITIS, UNSPECIFIED, WITHOUT BLEEDING: ICD-10-CM

## 2023-10-12 DIAGNOSIS — K29.70 GASTRITIS, UNSPECIFIED, W/OUT BLEEDING: ICD-10-CM

## 2023-10-12 PROCEDURE — 99214 OFFICE O/P EST MOD 30 MIN: CPT | Mod: 25

## 2023-10-12 PROCEDURE — 93010 ELECTROCARDIOGRAM REPORT: CPT

## 2023-10-12 PROCEDURE — G0463: CPT

## 2023-10-13 ENCOUNTER — TRANSCRIPTION ENCOUNTER (OUTPATIENT)
Age: 68
End: 2023-10-13

## 2023-10-13 PROBLEM — K29.70 GASTRITIS: Status: ACTIVE | Noted: 2023-10-13

## 2023-10-13 LAB
ALBUMIN SERPL ELPH-MCNC: 4.2 G/DL
ALP BLD-CCNC: 83 U/L
ALT SERPL-CCNC: 19 U/L
ANION GAP SERPL CALC-SCNC: 11 MMOL/L
APPEARANCE: CLEAR
APTT BLD: 31.6 SEC
AST SERPL-CCNC: 23 U/L
BACTERIA UR CULT: NORMAL
BACTERIA: NEGATIVE /HPF
BASOPHILS # BLD AUTO: 0.04 K/UL
BASOPHILS NFR BLD AUTO: 0.4 %
BILIRUB SERPL-MCNC: 0.3 MG/DL
BILIRUBIN URINE: NEGATIVE
BLOOD URINE: ABNORMAL
BUN SERPL-MCNC: 15 MG/DL
CALCIUM SERPL-MCNC: 9.4 MG/DL
CAST: 1 /LPF
CHLORIDE SERPL-SCNC: 107 MMOL/L
CO2 SERPL-SCNC: 26 MMOL/L
COLOR: YELLOW
CREAT SERPL-MCNC: 1.03 MG/DL
EGFR: 60 ML/MIN/1.73M2
EOSINOPHIL # BLD AUTO: 0.13 K/UL
EOSINOPHIL NFR BLD AUTO: 1.4 %
EPITHELIAL CELLS: 1 /HPF
GLUCOSE QUALITATIVE U: NEGATIVE MG/DL
GLUCOSE SERPL-MCNC: 92 MG/DL
HCT VFR BLD CALC: 36.8 %
HGB BLD-MCNC: 12 G/DL
IMM GRANULOCYTES NFR BLD AUTO: 0.5 %
INR PPP: 1.01 RATIO
KETONES URINE: NEGATIVE MG/DL
LEUKOCYTE ESTERASE URINE: NEGATIVE
LYMPHOCYTES # BLD AUTO: 2.28 K/UL
LYMPHOCYTES NFR BLD AUTO: 24.7 %
MAN DIFF?: NORMAL
MCHC RBC-ENTMCNC: 27.1 PG
MCHC RBC-ENTMCNC: 32.6 GM/DL
MCV RBC AUTO: 83.1 FL
MICROSCOPIC-UA: NORMAL
MONOCYTES # BLD AUTO: 0.81 K/UL
MONOCYTES NFR BLD AUTO: 8.8 %
NEUTROPHILS # BLD AUTO: 5.92 K/UL
NEUTROPHILS NFR BLD AUTO: 64.2 %
NITRITE URINE: NEGATIVE
PH URINE: 6
PLATELET # BLD AUTO: 384 K/UL
POTASSIUM SERPL-SCNC: 3.9 MMOL/L
PROT SERPL-MCNC: 6.9 G/DL
PROTEIN URINE: NEGATIVE MG/DL
PT BLD: 11.4 SEC
RBC # BLD: 4.43 M/UL
RBC # FLD: 15.4 %
RED BLOOD CELLS URINE: 3 /HPF
SODIUM SERPL-SCNC: 144 MMOL/L
SPECIFIC GRAVITY URINE: 1.01
UROBILINOGEN URINE: 0.2 MG/DL
WBC # FLD AUTO: 9.23 K/UL
WHITE BLOOD CELLS URINE: 0 /HPF

## 2023-10-13 RX ORDER — HYDROCORTISONE 10 MG/G
1 OINTMENT TOPICAL
Qty: 1 | Refills: 2 | Status: DISCONTINUED | COMMUNITY
Start: 2022-12-01 | End: 2023-10-13

## 2023-10-13 RX ORDER — OMEPRAZOLE 10 MG/1
10 CAPSULE, DELAYED RELEASE ORAL
Qty: 30 | Refills: 0 | Status: ACTIVE | COMMUNITY
Start: 2023-10-13

## 2023-11-19 ENCOUNTER — NON-APPOINTMENT (OUTPATIENT)
Age: 68
End: 2023-11-19

## 2024-03-01 ENCOUNTER — APPOINTMENT (OUTPATIENT)
Dept: PODIATRY | Facility: CLINIC | Age: 69
End: 2024-03-01
Payer: MEDICARE

## 2024-03-01 DIAGNOSIS — M76.829 POSTERIOR TIBIAL TENDINITIS, UNSPECIFIED LEG: ICD-10-CM

## 2024-03-01 DIAGNOSIS — M79.672 PAIN IN LEFT FOOT: ICD-10-CM

## 2024-03-01 PROCEDURE — 20550 NJX 1 TENDON SHEATH/LIGAMENT: CPT | Mod: LT

## 2024-03-01 PROCEDURE — 73630 X-RAY EXAM OF FOOT: CPT | Mod: LT

## 2024-03-01 PROCEDURE — 99203 OFFICE O/P NEW LOW 30 MIN: CPT | Mod: 25

## 2024-03-01 RX ORDER — MELOXICAM 15 MG/1
15 TABLET ORAL DAILY
Qty: 10 | Refills: 0 | Status: ACTIVE | COMMUNITY
Start: 2024-03-01 | End: 1900-01-01

## 2024-03-05 PROBLEM — M79.672 LEFT FOOT PAIN: Status: ACTIVE | Noted: 2024-03-04

## 2024-03-05 PROBLEM — M76.829 POSTERIOR TIBIAL TENDONITIS: Status: ACTIVE | Noted: 2024-03-04

## 2024-03-05 NOTE — ASSESSMENT
[FreeTextEntry1] : Impression: Posterior tibial tendonitis. Plantar fasciitis. Pain left foot.  Treatment: Treatment is multifold. First of all, she needs to get Powerstep arch supports with Bondi-8 Hoka sneakers. She is going to do that immediately and this will make a very big difference going forward. I also gave her Compressogrip. She is going to start Meloxicam once a day with food. Stop with any stomach irritation.  After obtaining verbal consent, a time out was performed to the identified area of maximal tenderness over the joint site.  I injected the medial band plantar fasciitis under strict sterile technique after prepping with alcohol and using Ethyl chloride with 1 1/2cc's of a combination of Xylocaine and Kenalog-40. She will follow my instructions, change her shoe gear, ice daily and with any worsening I am going to her in a CAM boot, something I am aggressively trying to avoid because of her knee problems and her right groin issues. I might also want to get an MRI with continued pain that persists. I will see her back in 3 weeks. There is no sign of weakness or drop-foot.

## 2024-03-05 NOTE — PHYSICAL EXAM
[2+] : right foot dorsalis pedis 2+ [Sensation] : the sensory exam was normal to light touch and pinprick [No Focal Deficits] : no focal deficits [Deep Tendon Reflexes (DTR)] : deep tendon reflexes were 2+ and symmetric [Motor Exam] : the motor exam was normal [Ankle Swelling (On Exam)] : not present [Varicose Veins Of Lower Extremities] : not present [] : not present [Delayed in the Right Toes] : capillary refills normal in right toes [Delayed in the Left Toes] : capillary refills normal in the left toes [FreeTextEntry3] : Hair growth noted on digits. Proximal to distal cooling is within normal limits.  [de-identified] : She has equinus and tight posterior muscle group. There is no calf sensitivity. No phlebitis. She wears support stockings. She has medial band insertional plantar fasciitis and obvious plantar fasciitis. She also has mild posterior tibial tendonitis. There is pain at the posterior tibial tendon at the medial ankle. There is no pain with resisted inversion. 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 She has slight limb-length discrepancy, very slight, left side shorter. [FreeTextEntry1] : She has mild swelling at the left medial ankle.

## 2024-03-05 NOTE — PROCEDURE
[FreeTextEntry1] : x-rays were taken to evaluate for spur, fracture or bony irregularity.  X-ray Report: (Left foot - 3 views) X-ray evaluation demonstrates flatfoot, dorsiflexed 1st ray, anterior break of the cyma line. No fracture. Large inferior calcaneal spur. No arthritis, abnormality or osteochondral lesion at the ankle.

## 2024-03-05 NOTE — HISTORY OF PRESENT ILLNESS
[FreeTextEntry1] : Patient presents today because she has pain for about a month at the left ankle and foot. She has been going to an orthopedist. She has knee problems and right groin. She is wearing a heel lift because of a slight limb length discrepancy, left side shorter. She has a swollen ankle for about a month.

## 2024-03-22 ENCOUNTER — APPOINTMENT (OUTPATIENT)
Dept: PODIATRY | Facility: CLINIC | Age: 69
End: 2024-03-22
Payer: MEDICARE

## 2024-03-22 DIAGNOSIS — M72.2 PLANTAR FASCIAL FIBROMATOSIS: ICD-10-CM

## 2024-03-22 PROCEDURE — 99212 OFFICE O/P EST SF 10 MIN: CPT

## 2024-03-26 PROBLEM — M72.2 PLANTAR FASCIITIS, LEFT: Status: ACTIVE | Noted: 2024-03-04

## 2024-04-01 NOTE — ASSESSMENT
[FreeTextEntry1] : Impression: Plantar fasciitis.   Treatment: I added metatarsal support to her shoes to destress the forefoot in her orthotics. I think this will be beneficial. She is following up with Dr. Buitrago. It is my recommendation that she goes for rheumatology consultation and gets arthritic profile as well. At this point she is going to continue the stability shoes and orthotics with met. pads. She is going for treatment with Dr. Buitrago. I feel she should get a rheumatology consultation because I think it is the right thing to do. If she has continued pain that persists after 3 weeks, I told her to call the office and I would get an MRI.

## 2024-04-01 NOTE — PHYSICAL EXAM
[2+] : left foot dorsalis pedis 2+ [Sensation] : the sensory exam was normal to light touch and pinprick [No Focal Deficits] : no focal deficits [Deep Tendon Reflexes (DTR)] : deep tendon reflexes were 2+ and symmetric [Motor Exam] : the motor exam was normal [Ankle Swelling (On Exam)] : not present [Varicose Veins Of Lower Extremities] : not present [] : not present [Delayed in the Right Toes] : capillary refills normal in right toes [Delayed in the Left Toes] : capillary refills normal in the left toes [FreeTextEntry3] : Hair growth noted on digits. Proximal to distal cooling is within normal limits.  [de-identified] : The plantar fasciitis is completely resolved and there is no sign of posterior tibial tendonitis. She has some sensitivity towards the 2nd MPJ on the left side. She has been wearing a patch. [FreeTextEntry1] : Slight swelling at the left forefoot.

## 2024-04-01 NOTE — HISTORY OF PRESENT ILLNESS
[FreeTextEntry1] : Patient presents today for evaluation of pain at the left foot, fasciitis and pain towards the ball of the foot. She had an allergic reaction to Meloxicam. She has been treated which has resolved. She presents today for evaluation and treatment. She has OTC orthotics and good stability shoes. Overall she is doing a lot better. She feels that she is doing about 80% better.

## 2024-06-26 ENCOUNTER — APPOINTMENT (OUTPATIENT)
Dept: ORTHOPEDIC SURGERY | Facility: CLINIC | Age: 69
End: 2024-06-26
Payer: MEDICARE

## 2024-06-26 DIAGNOSIS — M16.11 UNILATERAL PRIMARY OSTEOARTHRITIS, RIGHT HIP: ICD-10-CM

## 2024-06-26 DIAGNOSIS — G89.29 PAIN IN RIGHT HIP: ICD-10-CM

## 2024-06-26 DIAGNOSIS — M25.551 PAIN IN RIGHT HIP: ICD-10-CM

## 2024-06-26 PROCEDURE — 73503 X-RAY EXAM HIP UNI 4/> VIEWS: CPT | Mod: RT

## 2024-06-26 PROCEDURE — 99203 OFFICE O/P NEW LOW 30 MIN: CPT

## 2024-07-08 ENCOUNTER — APPOINTMENT (OUTPATIENT)
Dept: ORTHOPEDIC SURGERY | Facility: CLINIC | Age: 69
End: 2024-07-08

## 2024-10-01 ENCOUNTER — APPOINTMENT (OUTPATIENT)
Dept: ORTHOPEDIC SURGERY | Facility: CLINIC | Age: 69
End: 2024-10-01
Payer: MEDICARE

## 2024-10-01 VITALS — BODY MASS INDEX: 33.12 KG/M2 | WEIGHT: 184 LBS

## 2024-10-01 VITALS — HEIGHT: 62.5 IN

## 2024-10-01 DIAGNOSIS — M16.11 UNILATERAL PRIMARY OSTEOARTHRITIS, RIGHT HIP: ICD-10-CM

## 2024-10-01 PROCEDURE — 99215 OFFICE O/P EST HI 40 MIN: CPT

## 2024-10-01 PROCEDURE — G2211 COMPLEX E/M VISIT ADD ON: CPT

## 2024-10-01 PROCEDURE — 73502 X-RAY EXAM HIP UNI 2-3 VIEWS: CPT

## 2024-10-01 NOTE — REVIEW OF SYSTEMS
[Joint Pain] : joint pain [Negative] : Heme/Lymph [Arthralgia] : arthralgia [Joint Stiffness] : joint stiffness

## 2024-10-01 NOTE — HISTORY OF PRESENT ILLNESS
[de-identified] : This is a very nice  68 year old  female experiencing  pain in the right hip and groin which is severe in intensity and has been going on for at least 3 months now.  She has multiple allergies (penicillin, adhesive, latex, nickel, copper).  She cannot take nsaids because of GI ulcer. The pain limits activities of daily living. Walking tolerance is  reduced. A cortisone shot can help. The patient denies any radiation of the pain to the feet and it is not associated with numbness, tingling, or weakness.

## 2024-10-01 NOTE — HISTORY OF PRESENT ILLNESS
[de-identified] : This is a very nice  68 year old  female experiencing  pain in the right hip and groin which is severe in intensity and has been going on for at least 3 months now.  She has multiple allergies (penicillin, adhesive, latex, nickel, copper).  She cannot take nsaids because of GI ulcer. The pain limits activities of daily living. Walking tolerance is  reduced. A cortisone shot can help. The patient denies any radiation of the pain to the feet and it is not associated with numbness, tingling, or weakness.

## 2024-10-01 NOTE — PHYSICAL EXAM
[de-identified] : Well developed, well nourished in no apparent distress, awake, alert and orientated to person, place and time. Respirations are even and unlabored. Gait evaluation reveals a limp. There is no inguinal adenopathy. Examination of the contralateral hip shows normal range of motion, strength, no tenderness, and intact skin. The affected limb is well-perfused, with palpable pedal pulses and no skin lesions Sensorimotor is grossly intact. Hip motion is reduced and causes pain. FADIR is positive and SHASTA is positive. Stinchfield test is positive. Leg lengths are approximately equal Both hips are stable and muscle strength is normal. [de-identified] :  AP pelvis, AP and lateral hip radiographs of the right hip were ordered and taken in the office and demonstrate degenerative joint disease of the hip with joint space narrowing, osteophyte formation, and subchondral sclerosis.  The patient brings with her a MRI of the right hip. I reviewed the imaging with the patient which demonstrates right hip osteoarthritis and a degenerative labral tear.

## 2024-10-01 NOTE — PHYSICAL EXAM
[de-identified] : Well developed, well nourished in no apparent distress, awake, alert and orientated to person, place and time. Respirations are even and unlabored. Gait evaluation reveals a limp. There is no inguinal adenopathy. Examination of the contralateral hip shows normal range of motion, strength, no tenderness, and intact skin. The affected limb is well-perfused, with palpable pedal pulses and no skin lesions Sensorimotor is grossly intact. Hip motion is reduced and causes pain. FADIR is positive and SHASTA is positive. Stinchfield test is positive. Leg lengths are approximately equal Both hips are stable and muscle strength is normal. [de-identified] :  AP pelvis, AP and lateral hip radiographs of the right hip were ordered and taken in the office and demonstrate degenerative joint disease of the hip with joint space narrowing, osteophyte formation, and subchondral sclerosis.  The patient brings with her a MRI of the right hip. I reviewed the imaging with the patient which demonstrates right hip osteoarthritis and a degenerative labral tear.

## 2024-10-01 NOTE — DISCUSSION/SUMMARY
[de-identified] : This patient has right hip osteoarthritis. I had a discussion with the patient, who is a candidate for a right total hip replacement. She asked about labral surgery only and I do not advise that with osteoarthritis. She asked about PRP and I do not advise that either for limited efficacy. Risks, benefits and alternatives were discussed. At this point, the patient wants to hold off as the pain is not quite severe enough. If they want to schedule in the future, then they will come in and we will have a full discussion. An extensive discussion was conducted on the natural history of the disease and the variety of surgical and non-surgical options available to the patient including, but not limited to non-steroidal anti-inflammatory medications, steroid injections, physical therapy, maintenance of ideal body weight, and reduction of activity. The patient will schedule an appointment as needed.

## 2024-10-01 NOTE — DISCUSSION/SUMMARY
[de-identified] : This patient has right hip osteoarthritis. I had a discussion with the patient, who is a candidate for a right total hip replacement. She asked about labral surgery only and I do not advise that with osteoarthritis. She asked about PRP and I do not advise that either for limited efficacy. Risks, benefits and alternatives were discussed. At this point, the patient wants to hold off as the pain is not quite severe enough. If they want to schedule in the future, then they will come in and we will have a full discussion. An extensive discussion was conducted on the natural history of the disease and the variety of surgical and non-surgical options available to the patient including, but not limited to non-steroidal anti-inflammatory medications, steroid injections, physical therapy, maintenance of ideal body weight, and reduction of activity. The patient will schedule an appointment as needed.

## 2024-10-13 ENCOUNTER — EMERGENCY (EMERGENCY)
Facility: HOSPITAL | Age: 69
LOS: 0 days | Discharge: HOME | End: 2024-10-13
Attending: STUDENT IN AN ORGANIZED HEALTH CARE EDUCATION/TRAINING PROGRAM
Payer: MEDICARE

## 2024-10-13 VITALS
TEMPERATURE: 98 F | OXYGEN SATURATION: 100 % | RESPIRATION RATE: 19 BRPM | HEIGHT: 62 IN | SYSTOLIC BLOOD PRESSURE: 137 MMHG | WEIGHT: 175.05 LBS | HEART RATE: 86 BPM | DIASTOLIC BLOOD PRESSURE: 80 MMHG

## 2024-10-13 VITALS
TEMPERATURE: 98 F | RESPIRATION RATE: 19 BRPM | SYSTOLIC BLOOD PRESSURE: 137 MMHG | DIASTOLIC BLOOD PRESSURE: 81 MMHG | OXYGEN SATURATION: 97 % | HEART RATE: 76 BPM

## 2024-10-13 DIAGNOSIS — R00.2 PALPITATIONS: ICD-10-CM
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
## 2024-10-13 DIAGNOSIS — T78.40XA ALLERGY, UNSPECIFIED, INITIAL ENCOUNTER: ICD-10-CM

## 2024-10-13 DIAGNOSIS — L29.9 PRURITUS, UNSPECIFIED: ICD-10-CM

## 2024-10-13 DIAGNOSIS — R60.0 LOCALIZED EDEMA: ICD-10-CM

## 2024-10-13 DIAGNOSIS — Z88.1 ALLERGY STATUS TO OTHER ANTIBIOTIC AGENTS: ICD-10-CM
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
## 2024-10-13 DIAGNOSIS — Z91.014 ALLERGY TO MAMMALIAN MEATS: ICD-10-CM

## 2024-10-13 DIAGNOSIS — Z88.0 ALLERGY STATUS TO PENICILLIN: ICD-10-CM

## 2024-10-13 DIAGNOSIS — R07.0 PAIN IN THROAT: ICD-10-CM

## 2024-10-13 PROCEDURE — 99284 EMERGENCY DEPT VISIT MOD MDM: CPT

## 2024-10-13 RX ORDER — PREDNISONE 5 MG/1
50 TABLET ORAL ONCE
Refills: 0 | Status: COMPLETED | OUTPATIENT
Start: 2024-10-13 | End: 2024-10-13

## 2024-10-13 RX ORDER — DIPHENHYDRAMINE HCL 12.5MG/5ML
50 LIQUID (ML) ORAL ONCE
Refills: 0 | Status: COMPLETED | OUTPATIENT
Start: 2024-10-13 | End: 2024-10-13

## 2024-10-13 RX ORDER — PREDNISONE 5 MG/1
1 TABLET ORAL
Qty: 4 | Refills: 0
Start: 2024-10-13 | End: 2024-10-16

## 2024-10-13 RX ORDER — ANTI-ITCH CREAM 1 G/100G
1 OINTMENT TOPICAL
Qty: 1 | Refills: 0
Start: 2024-10-13 | End: 2024-10-17

## 2024-10-13 RX ORDER — FAMOTIDINE 40 MG
1 TABLET ORAL
Qty: 10 | Refills: 0
Start: 2024-10-13 | End: 2024-10-17

## 2024-10-13 RX ORDER — DIPHENHYDRAMINE HCL 12.5MG/5ML
1 LIQUID (ML) ORAL
Qty: 15 | Refills: 0
Start: 2024-10-13 | End: 2024-10-17

## 2024-10-13 RX ORDER — FAMOTIDINE 40 MG
20 TABLET ORAL ONCE
Refills: 0 | Status: COMPLETED | OUTPATIENT
Start: 2024-10-13 | End: 2024-10-13

## 2024-10-13 RX ADMIN — Medication 20 MILLIGRAM(S): at 12:06

## 2024-10-13 RX ADMIN — PREDNISONE 50 MILLIGRAM(S): 5 TABLET ORAL at 12:06

## 2024-10-13 RX ADMIN — Medication 50 MILLIGRAM(S): at 12:06

## 2024-10-13 NOTE — ED PROVIDER NOTE - CLINICAL SUMMARY MEDICAL DECISION MAKING FREE TEXT BOX
68-year-old no pertinent past medical history who presents for evaluation of palpitations and sensation of throat tightening after noting facial swelling this morning with associated itching.  Patient reports she has been improved palpitations improved when she sat down.  She denies using any new medications or substances.  She states she did not go outside yesterday.  She denies any new detergents.  Does have itchiness around the cheeks.  On assessment there is no throat swelling, no tongue swelling, voice clear, no stridor, lungs clear, heart regular rate and rhythm, no hypoxia, normal respirations work of breathing, abdomen soft nontender, rash localized to periorbital region erythematous, pruritic, mild periorbital edema.  Will start steroids and Pepcid and Benadryl for symptom management for suspected allergic reaction unclear precipitant, will send EpiPen, and patient educated in case symptoms worsen use of EpiPen.  Patient advised to follow-up with allergy specialist within 1 week.  Return precautions discussed.  Patient monitored in the emergency room, no progression of symptoms.  Patient be given patient states that her right hand facial rash is significantly uncomfortable however patient advised not to use on face or get exposed to sunlight due to possible hypopigmentation and thinning of the skin and not to use medication near eyes 68-year-old no pertinent past medical history who presents for evaluation of palpitations and sensation of throat tightening after noting facial swelling and rash this morning with associated itching.  Patient reports she palpitations improved when she sat down. She has no chest pain or SOB.  She denies using any new medications or substances.  She states she did not go outside yesterday.  She denies any new detergents.  Does have itchiness around the cheeks.  On assessment there is no throat swelling, no tongue swelling, voice clear, no stridor, lungs clear, heart regular rate and rhythm, no hypoxia, normal respirations /work of breathing, abdomen soft nontender, rash localized to periorbital region erythematous, pruritic, mild periorbital edema.    screening ekg NSR with normal rate.   No signs of anaphylaxis on exam. Will start steroids and Pepcid and Benadryl for symptom management for suspected allergic reaction unclear precipitant, will send EpiPen, and patient educated in case symptoms worsen use of EpiPen.   Patient advised to follow-up with allergy specialist within 1 week.  Return precautions discussed.  Patient monitored in the emergency room, no progression of symptoms.    Patient will be given mild topical steroid for any persistent itchiness/discomfort however patient advised not to use on face or get exposed to sunlight due to possible hypopigmentation and thinning of the skin and not to use medication near eyes 68-year-old no pertinent past medical history who presents for evaluation of palpitations and sensation of throat tightening after noting facial swelling and rash this morning with associated itching.  Patient reports she palpitations improved when she sat down. She has no chest pain . Denies ongoing SOB though does state she had it earlier with palpitations..  She denies using any new medications or substances.  She states she did not go outside yesterday.  She denies any new detergents.  Does have itchiness around the cheeks.  On assessment there is no throat swelling, no tongue swelling, voice clear, no stridor, lungs clear, heart regular rate and rhythm, no hypoxia, normal respirations /work of breathing, abdomen soft nontender, rash localized to periorbital region erythematous, pruritic, mild periorbital edema.    screening ekg NSR with normal rate.   No signs of anaphylaxis on exam. Will start steroids and Pepcid and Benadryl for symptom management for suspected allergic reaction unclear precipitant, will send EpiPen, and patient educated in case symptoms worsen use of EpiPen.   Patient advised to follow-up with allergy specialist within 1 week.  Return precautions discussed.  Patient monitored in the emergency room, no progression of symptoms.    Patient will be given mild topical steroid for any persistent itchiness/discomfort however patient advised not to use on face or get exposed to sunlight due to possible hypopigmentation and thinning of the skin and not to use medication near eyes

## 2024-10-13 NOTE — ED PROVIDER NOTE - NSFOLLOWUPINSTRUCTIONS_ED_ALL_ED_FT
You were seen today for allergic reaction,  rash around the cheeks, it is unclear what caused it.  We will start you on steroids.      Continue diphenydramine/ Benadryl 50 mg every 8 hours although this medication can be sedating and cause sleepiness and drowsiness.  This medication is an antihistamine which will help with itching and rash symptoms, allergy swelling.    Continue steroids prednisone 50 mg once a day for the next 4 days, you received your first dose here today.  This medication helps to relieve the allergic reaction.    Continue Pepcid 20 mg over 12 hours as needed for stomach acid     Return to the emergency room immediately for any shortness of breath or significant throat swelling or any worsening symptoms or any chest pain.    We also sent a low potency steroid cream.  You can apply it to the cheeks if your rash is significantly itchy though however we advised to avoid use of steroids near the eyes or over the skin due to sensitivity of the skin.  Please follow-up with your allergist as soon as possible    You were also sent Epinephrine pen to be used for emergency - only if you have throat swelling, trouble breathing or severe allergic reaction - after use, you MUST call 911     General Allergic Reaction    WHAT YOU NEED TO KNOW:    An allergic reaction is your body's response to an allergen. Allergens include medicines, food, insect stings, animal dander, mold, latex, chemicals, and dust mites. Pollen from trees, grass, and weeds can also cause an allergic reaction. An allergic reaction can range from mild to severe.    DISCHARGE INSTRUCTIONS:    Call 911 for signs or symptoms of anaphylaxis, such as trouble breathing, swelling in your mouth or throat, or wheezing. You may also have itching, a rash, hives, or feel like you are going to faint.    Return to the emergency department if:     You have a skin rash, hives, swelling, or itching that is starting to get worse.      Your throat tightens, or your lips or tongue swell.      You have trouble swallowing or speaking.      You have worsening nausea, diarrhea, or abdominal cramps, or you are vomiting.      You have chest pain or tightness.    Contact your healthcare provider if:     You have questions or concerns about your condition or care.        Medicines: You may need any of the following:     Medicines may be given to relieve certain allergy symptoms such as itching, sneezing, and swelling. You may take them as a pill or use drops in your nose or eyes. Topical treatments may be given to put directly on your skin to help decrease itching or swelling.      Epinephrine may be prescribed if you are at risk for anaphylaxis. This is a severe allergic reaction that can be life-threatening. Your healthcare provider will tell you if you need to keep epinephrine with you. You will be taught when and how to use it.      Take your medicine as directed. Contact your healthcare provider if you think your medicine is not helping or if you have side effects. Tell him of her if you are allergic to any medicine. Keep a list of the medicines, vitamins, and herbs you take. Include the amounts, and when and why you take them. Bring the list or the pill bottles to follow-up visits. Carry your medicine list with you in case of an emergency.    Follow up with your healthcare provider as directed: Write down your questions so you remember to ask them during your visits.     Manage your symptoms:     Avoid allergens. You may need to have allergy testing with your healthcare provider or a specialist to find your allergens.      Use cold compresses on your skin or eyes. This will help soothe skin or eyes affected by the allergic reaction. You can make a cold compress by soaking a washcloth in cool water. Wring out the extra water before you apply the washcloth.      Rinse your nasal passages with a saline solution. Daily rinsing may help clear allergens out of your nose. Use distilled water if possible. You can also boil tap water and then let it cool before you use it. Do not use tap water without boiling it first.      Do not smoke. Nicotine and other chemicals in cigarettes and cigars can make an allergic reaction worse, and can also cause lung damage. Ask your healthcare provider for information if you currently smoke and need help to quit. E-cigarettes or smokeless tobacco still contain nicotine. Talk to your healthcare provider before you use these products.

## 2024-10-13 NOTE — ED PROVIDER NOTE - NSFOLLOWUPCLINICS_GEN_ALL_ED_FT
[facility]  Allergy  [address] [place] [address]  Phone: [phone]  Fax:

## 2024-10-13 NOTE — ED PROVIDER NOTE - TOBACCO USE
[facility]

              [address] [place] [address]

Test Date:    2020               Test Time:    22:51:36

Pat Name:     LORENE KIRKPATRICK            Department:   

Patient ID:   [id number]           Room:          

Gender:       F                        Technician:   

:          1969               Requested By: DANITA GONZALEZ

Order Number: [id number]           Reading MD:     

                                 Measurements

Intervals                              Axis          

Rate:         71                       P:            26

NY:           158                      QRS:          15

QRSD:         84                       T:            18

QT:           422                                    

QTc:          464                                    

                           Interpretive Statements

SINUS RHYTHM

NO SPECIFIC ECG ABNORMALITIES

RI6.01

No previous ECG available for comparison Never smoker

## 2024-10-13 NOTE — ED ADULT TRIAGE NOTE - PAIN: PRESENCE, MLM
denies pain/discomfort (Rating = 0) Simple / Intermediate / Complex Repair - Final Wound Length In Cm: 0

## 2024-10-13 NOTE — ED PROVIDER NOTE - PHYSICAL EXAMINATION
Gen: aox3, nad,   Head: NCAT  ENT: Airway patent, moist mucous membranes, nasal passageways clear, no pharyngeal erythema or exudates, uvula midline, no cervical lymphadenopathy, no stridor   Cardiac: Normal rate, normal rhythm, no murmurs   Respiratory: Lungs CTA B/L  Gastrointestinal: Abdomen soft, nontender, nondistended, no rebound, no guarding  MSK: No gross abnormalities, FROM of all four extremities, no edema  HEME: Extremities warm and well perfused   Skin: mild periorbital swelling and blanching erythematous pruritic rash over b/l cheeks   Neuro: No gross neurologic deficits, normal speech, no gait abnormality

## 2024-10-13 NOTE — ED ADULT NURSE REASSESSMENT NOTE - NS ED NURSE REASSESS COMMENT FT1
PT STATEs improvement on itchiness and eyelid swelling. AOx4, responsive with clear speech and speaking in full sentence, not in respiratory distress.

## 2024-10-13 NOTE — ED ADULT NURSE NOTE - OBJECTIVE STATEMENT
PMH: denies. PMH: denies.  Pt AOx4, responsive with clear speech and speaking in full sentence, not in respiratory distress, ambulatory. Pt c/o allergic reaction to unknown source starting around 2:30am this morning. Pt presents with facial redness, facial swelling to cheeks and eyelid. Pt c/o itchiness to face. No edema to lips, tongue or difficulty breathing or swallowing. Denies CP/SOB, fever/chills, n/v/d.

## 2024-10-13 NOTE — ED ADULT TRIAGE NOTE - CHIEF COMPLAINT QUOTE
Came in for allergic reaction, cause unknown. Facial swelling noted and complains of difficulty breathing. Speaks in full sentences.

## 2024-10-13 NOTE — ED PROVIDER NOTE - PATIENT PORTAL LINK FT
You can access the FollowMyHealth Patient Portal offered by [facility] by registering at the following website: [email or web address]. By joining [employer]’s FollowMyHealth portal, you will also be able to view your health information using other applications (apps) compatible with our system.

## 2024-11-11 NOTE — ASSESSMENT
[FreeTextEntry1] : 1) reassured about coated tongue, salivary deposits in dry mouth - likely was mouth breathing overnight with some of her congestion first nights of the viral syndrome.  Believe her nausea/gastritic syx and diarrhea x a day, now resolving, also c/w overall viral syndrome.  Will hydrate, rx diarrhea supportively.  Also suggested water pik over next few days if coating bothers her.  
musculoskeletal

## 2025-02-24 NOTE — ED PROVIDER NOTE - NS ED ATTENDING STATEMENT MOD
Discussed with mom rash to left axilla is molluscum contagiosum and is likely spreading due to itching.  We discussed good emollient lotion to dry skin.  Discussed OTC treatments and how to use if not improving.  Discussed with mom would continue to monitor and if worsening may set up with dermatology.  
Attending Only